# Patient Record
Sex: FEMALE | Race: WHITE | NOT HISPANIC OR LATINO | Employment: OTHER | URBAN - METROPOLITAN AREA
[De-identification: names, ages, dates, MRNs, and addresses within clinical notes are randomized per-mention and may not be internally consistent; named-entity substitution may affect disease eponyms.]

---

## 2021-12-15 ENCOUNTER — NEW PATIENT (OUTPATIENT)
Dept: URBAN - METROPOLITAN AREA CLINIC 33 | Facility: CLINIC | Age: 61
End: 2021-12-15

## 2021-12-15 VITALS
HEIGHT: 65 IN | SYSTOLIC BLOOD PRESSURE: 162 MMHG | DIASTOLIC BLOOD PRESSURE: 92 MMHG | HEART RATE: 61 BPM | BODY MASS INDEX: 18.99 KG/M2 | WEIGHT: 114 LBS

## 2021-12-15 DIAGNOSIS — H43.392: ICD-10-CM

## 2021-12-15 DIAGNOSIS — H33.311: ICD-10-CM

## 2021-12-15 DIAGNOSIS — H43.11: ICD-10-CM

## 2021-12-15 PROCEDURE — 92004 COMPRE OPH EXAM NEW PT 1/>: CPT

## 2021-12-15 PROCEDURE — 67141 PROPH RTA DTCHMNT CRTX DTHRM: CPT

## 2021-12-15 ASSESSMENT — VISUAL ACUITY
OS_CC: 20/15
OD_CC: 20/60

## 2021-12-15 ASSESSMENT — TONOMETRY
OS_IOP_MMHG: 17
OD_IOP_MMHG: 18
OS_IOP_MMHG: 15
OD_IOP_MMHG: 20

## 2021-12-15 NOTE — PROCEDURE NOTE: CLINICAL
PROCEDURE NOTE: Cryotherapy for Retinal Tear #1 OD. Diagnosis: Horseshoe Tear of Retina Without Detachment. Anesthesia: Subconjunctival. Prep: Proparacaine 0.5%. Prior to procedure, risks/benefits/alternatives discussed including loss of vision, hemorrhage, cataract, need for laser and/or surgery and patient wished to proceed. Location of treatment: *. Number of applications: *. Patient tolerated procedure well. There were no complications. Post procedure instructions given. Patient given office phone number/answering service number and advised to call immediately should there be loss of vision or pain, or should they have any other questions or concerns. Hector Hoyos

## 2021-12-15 NOTE — PATIENT DISCUSSION
Recommended CRYORETINOPEXY. Discussed alternatives/benefits/risks to include development of new tears, tears along the edge of treated area, and retinal detachment.

## 2021-12-21 ENCOUNTER — POST-OP (OUTPATIENT)
Dept: URBAN - METROPOLITAN AREA CLINIC 33 | Facility: CLINIC | Age: 61
End: 2021-12-21

## 2021-12-21 DIAGNOSIS — H33.311: ICD-10-CM

## 2021-12-21 PROCEDURE — 99024 POSTOP FOLLOW-UP VISIT: CPT

## 2021-12-21 PROCEDURE — 67145 PROPH RTA DTCHMNT PC: CPT

## 2021-12-21 ASSESSMENT — VISUAL ACUITY
OS_CC: 20/20-2
OD_CC: 20/20-1

## 2021-12-21 ASSESSMENT — TONOMETRY
OD_IOP_MMHG: 13
OS_IOP_MMHG: 14

## 2021-12-21 NOTE — PROCEDURE NOTE: CLINICAL
PROCEDURE NOTE: Laser for Retinal Tear #1 OD. Diagnosis: Horseshoe Tear of Retina Without Detachment. Anesthesia: Topical. Prior to laser, risks/benefits/alternatives to laser discussed including loss of vision, decreased peripheral and night vision, need for more laser and/or surgery and patient wished to proceed. An informed consent was obtained and no assurances or guarantees were given. Spot size:100-200* um. Pulse power: 200-320* mW. Pulse duration: 80* ms. Number of pulses: 681*. Procedure Time: 1215PM*. Patient tolerated procedure well. There were no complications. Post-op instructions given. Patient given office phone number/answering service number and advised to call immediately should there be loss of vision or pain, or should they have any other questions or concerns. Blaine Massey

## 2021-12-27 ENCOUNTER — POST-OP (OUTPATIENT)
Dept: URBAN - METROPOLITAN AREA CLINIC 33 | Facility: CLINIC | Age: 61
End: 2021-12-27

## 2021-12-27 DIAGNOSIS — H43.11: ICD-10-CM

## 2021-12-27 DIAGNOSIS — H33.311: ICD-10-CM

## 2021-12-27 PROCEDURE — 99024 POSTOP FOLLOW-UP VISIT: CPT

## 2021-12-27 PROCEDURE — 92250 FUNDUS PHOTOGRAPHY W/I&R: CPT

## 2021-12-27 ASSESSMENT — VISUAL ACUITY
OD_CC: 20/25+2
OS_CC: 20/20-1

## 2021-12-27 ASSESSMENT — TONOMETRY
OD_IOP_MMHG: 14
OS_IOP_MMHG: 13

## 2022-01-24 ENCOUNTER — POST-OP (OUTPATIENT)
Dept: URBAN - METROPOLITAN AREA CLINIC 33 | Facility: CLINIC | Age: 62
End: 2022-01-24

## 2022-01-24 VITALS — HEIGHT: 65 IN | BODY MASS INDEX: 18.83 KG/M2 | WEIGHT: 113 LBS

## 2022-01-24 DIAGNOSIS — H33.311: ICD-10-CM

## 2022-01-24 DIAGNOSIS — H43.392: ICD-10-CM

## 2022-01-24 DIAGNOSIS — H43.11: ICD-10-CM

## 2022-01-24 DIAGNOSIS — H35.363: ICD-10-CM

## 2022-01-24 PROCEDURE — 99024 POSTOP FOLLOW-UP VISIT: CPT

## 2022-01-24 PROCEDURE — 92250 FUNDUS PHOTOGRAPHY W/I&R: CPT

## 2022-01-24 ASSESSMENT — TONOMETRY
OD_IOP_MMHG: 13
OS_IOP_MMHG: 15

## 2022-01-24 ASSESSMENT — VISUAL ACUITY
OD_CC: 20/25
OS_CC: 20/20

## 2022-01-27 ENCOUNTER — POST-OP (OUTPATIENT)
Dept: URBAN - METROPOLITAN AREA CLINIC 26 | Facility: CLINIC | Age: 62
End: 2022-01-27

## 2022-01-27 DIAGNOSIS — H35.363: ICD-10-CM

## 2022-01-27 DIAGNOSIS — H43.392: ICD-10-CM

## 2022-01-27 DIAGNOSIS — H43.11: ICD-10-CM

## 2022-01-27 DIAGNOSIS — H33.311: ICD-10-CM

## 2022-01-27 PROCEDURE — 99024 POSTOP FOLLOW-UP VISIT: CPT

## 2022-01-27 PROCEDURE — 92201 OPSCPY EXTND RTA DRAW UNI/BI: CPT

## 2022-01-27 ASSESSMENT — VISUAL ACUITY
OD_CC: 20/20-1
OS_CC: 20/20-1

## 2022-01-27 ASSESSMENT — TONOMETRY
OD_IOP_MMHG: 15
OS_IOP_MMHG: 16

## 2024-10-02 ENCOUNTER — NEW PATIENT (OUTPATIENT)
Dept: URBAN - METROPOLITAN AREA CLINIC 32 | Facility: CLINIC | Age: 64
End: 2024-10-02

## 2024-10-02 DIAGNOSIS — H35.363: ICD-10-CM

## 2024-10-02 DIAGNOSIS — H25.13: ICD-10-CM

## 2024-10-02 DIAGNOSIS — H16.223: ICD-10-CM

## 2024-10-02 DIAGNOSIS — H43.813: ICD-10-CM

## 2024-10-02 DIAGNOSIS — H33.311: ICD-10-CM

## 2024-10-02 PROCEDURE — 92015 DETERMINE REFRACTIVE STATE: CPT

## 2024-10-02 PROCEDURE — 99204 OFFICE O/P NEW MOD 45 MIN: CPT

## 2024-10-02 PROCEDURE — 92250 FUNDUS PHOTOGRAPHY W/I&R: CPT

## 2024-10-02 PROCEDURE — 92310-1 LEVEL 1 CONTACT LENS MANAGEMENT

## 2025-08-05 ENCOUNTER — COMPREHENSIVE EXAM (OUTPATIENT)
Age: 65
End: 2025-08-05

## 2025-08-05 DIAGNOSIS — H43.813: ICD-10-CM

## 2025-08-05 DIAGNOSIS — H35.363: ICD-10-CM

## 2025-08-05 DIAGNOSIS — H16.223: ICD-10-CM

## 2025-08-05 DIAGNOSIS — H33.311: ICD-10-CM

## 2025-08-05 DIAGNOSIS — H25.13: ICD-10-CM

## 2025-08-05 PROCEDURE — 92015 DETERMINE REFRACTIVE STATE: CPT

## 2025-08-05 PROCEDURE — 92310-1 LEVEL 1 SOFT LENS UPDATE

## 2025-08-05 PROCEDURE — 99214 OFFICE O/P EST MOD 30 MIN: CPT

## 2025-08-05 PROCEDURE — 92250 FUNDUS PHOTOGRAPHY W/I&R: CPT
